# Patient Record
Sex: MALE | Race: BLACK OR AFRICAN AMERICAN | Employment: PART TIME | URBAN - METROPOLITAN AREA
[De-identification: names, ages, dates, MRNs, and addresses within clinical notes are randomized per-mention and may not be internally consistent; named-entity substitution may affect disease eponyms.]

---

## 2018-06-11 PROCEDURE — 99283 EMERGENCY DEPT VISIT LOW MDM: CPT

## 2018-06-12 ENCOUNTER — HOSPITAL ENCOUNTER (EMERGENCY)
Facility: CLINIC | Age: 43
Discharge: HOME | End: 2018-06-12
Attending: EMERGENCY MEDICINE | Admitting: EMERGENCY MEDICINE
Payer: COMMERCIAL

## 2018-06-12 ENCOUNTER — APPOINTMENT (OUTPATIENT)
Dept: CT IMAGING | Facility: CLINIC | Age: 43
End: 2018-06-12
Attending: EMERGENCY MEDICINE
Payer: COMMERCIAL

## 2018-06-12 VITALS
SYSTOLIC BLOOD PRESSURE: 135 MMHG | OXYGEN SATURATION: 98 % | HEIGHT: 71 IN | BODY MASS INDEX: 25.2 KG/M2 | TEMPERATURE: 98.2 F | RESPIRATION RATE: 18 BRPM | DIASTOLIC BLOOD PRESSURE: 82 MMHG | WEIGHT: 180 LBS

## 2018-06-12 DIAGNOSIS — R10.9 LEFT FLANK PAIN: ICD-10-CM

## 2018-06-12 LAB
ALBUMIN UR-MCNC: NEGATIVE MG/DL
APPEARANCE UR: CLEAR
BILIRUB UR QL STRIP: NEGATIVE
COLOR UR AUTO: YELLOW
GLUCOSE UR STRIP-MCNC: NEGATIVE MG/DL
HGB UR QL STRIP: NEGATIVE
KETONES UR STRIP-MCNC: NEGATIVE MG/DL
LEUKOCYTE ESTERASE UR QL STRIP: NEGATIVE
MUCOUS THREADS #/AREA URNS LPF: PRESENT /LPF
NITRATE UR QL: NEGATIVE
PH UR STRIP: 6 PH (ref 5–7)
RBC #/AREA URNS AUTO: 2 /HPF (ref 0–2)
SOURCE: ABNORMAL
SP GR UR STRIP: 1.03 (ref 1–1.03)
UROBILINOGEN UR STRIP-MCNC: 0 MG/DL (ref 0–2)
WBC #/AREA URNS AUTO: 1 /HPF (ref 0–5)

## 2018-06-12 PROCEDURE — 25000132 ZZH RX MED GY IP 250 OP 250 PS 637: Performed by: EMERGENCY MEDICINE

## 2018-06-12 PROCEDURE — 72131 CT LUMBAR SPINE W/O DYE: CPT

## 2018-06-12 PROCEDURE — 81001 URINALYSIS AUTO W/SCOPE: CPT | Performed by: EMERGENCY MEDICINE

## 2018-06-12 PROCEDURE — 74176 CT ABD & PELVIS W/O CONTRAST: CPT

## 2018-06-12 RX ORDER — DIAZEPAM 5 MG
5 TABLET ORAL EVERY 6 HOURS PRN
Qty: 20 TABLET | Refills: 0 | Status: SHIPPED | OUTPATIENT
Start: 2018-06-12 | End: 2018-06-19

## 2018-06-12 RX ORDER — DIAZEPAM 5 MG
5 TABLET ORAL ONCE
Status: COMPLETED | OUTPATIENT
Start: 2018-06-12 | End: 2018-06-12

## 2018-06-12 RX ORDER — OXYCODONE HYDROCHLORIDE 5 MG/1
5 TABLET ORAL ONCE
Status: COMPLETED | OUTPATIENT
Start: 2018-06-12 | End: 2018-06-12

## 2018-06-12 RX ADMIN — OXYCODONE HYDROCHLORIDE 5 MG: 5 TABLET ORAL at 04:55

## 2018-06-12 RX ADMIN — DIAZEPAM 5 MG: 5 TABLET ORAL at 04:55

## 2018-06-12 ASSESSMENT — ENCOUNTER SYMPTOMS
ABDOMINAL PAIN: 0
DIFFICULTY URINATING: 0
FEVER: 0
ARTHRALGIAS: 1
MYALGIAS: 1

## 2018-06-12 NOTE — ED PROVIDER NOTES
"  History   Chief Complaint:  Flank and Back Pain    HPI   Rohan Whitt is a 43 year old male who presents with back pain. The patient reports he has been having back and left flank pain for the past 2 weeks that travels to his knee and through his ribs. He says his pain has been getting progressively worse of the past 2 weeks, prompting him to visit the [facility] to address his pain. He was prescribed Oxycodone, but still experiences his pain, prompting him to visit the emergency department. The patient reports his pain was not present earlier today, but he felt a sudden onset of severe pain while at work today. He states that his left big toe occasionally \"locks up\", but does not feel this in the emergency department tonight. He says this has been happening to his left toe for about 4 days and has taken Advil to address his symptoms, but found little resolve. He states he took 10 mg Oxycodone at 2215 today. The patient says he has been taking Acutane for 3 months for his severe acne, but stopped taking it 4 days ago because he was worried the medication was causing his current pain. The patient denies fever, trouble urinating, and trouble pooping.    Of note, the patient states [place] side of his body was hurt. He states he was knocked unconscious, but had no fractures. He says that this is where he formed his relationship with the pain clinic he visited for his current symptoms.    Allergies:  NKDA     Medications:    Oxycodone  Ranitidine    Past Medical History:    GERD  Brachial neuritis or radiculitis  Cervicalgia    Past Surgical History:    Eye surgery    Family History:    No past pertinent family history.    Social History:  Marital Status:   Presents to the emergency department with his wife.  Negative for alcohol use.  Negative for tobacco use.     Review of Systems   Constitutional: Negative for fever. " "  Gastrointestinal: Negative for abdominal pain.   Genitourinary: Negative for difficulty urinating.   Musculoskeletal: Positive for arthralgias and myalgias.   Skin: Negative for rash.   All other systems reviewed and are negative.    Physical Exam     Patient Vitals for the past 24 hrs:   BP Temp Temp src Heart Rate Resp SpO2 Height Weight   06/12/18 0445 135/82 - - - - 98 % - -   06/12/18 0430 131/88 - - - - 98 % - -   06/12/18 0415 (!) 129/91 - - - - 95 % - -   06/12/18 0400 131/89 - - - - 97 % - -   06/12/18 0345 (!) 127/95 - - - - 98 % - -   06/12/18 0330 128/79 - - - - 98 % - -   06/12/18 0300 137/90 - - - - - - -   06/12/18 0245 119/75 - - - - - - -   06/12/18 0230 122/78 - - - - 96 % - -   06/12/18 0215 135/83 - - - - 96 % - -   06/12/18 0200 133/81 - - - - 97 % - -   06/12/18 0145 131/90 - - - - - - -   06/12/18 0115 123/78 - - - - - - -   06/12/18 0100 128/80 - - - - - - -   06/12/18 0045 (!) 153/96 - - - - 99 % - -   06/12/18 0030 121/75 - - - - 96 % - -   06/11/18 2334 130/60 98.2  F (36.8  C) Temporal 87 18 100 % 1.803 m (5' 11\") 81.6 kg (180 lb)     Physical Exam  Constitutional:  Appears well-developed and well-nourished. Alert. Conversant. Non toxic.  HENT:   Head: Atraumatic.   Nose: Nose normal.  Mouth/Throat: Oral mucosa is clear and moist. no trismus. Pharynx normal. Tonsils symmetric. No tonsillar enlargement, erythema, or exudate.  Eyes: Conjunctivae normal. EOM normal. Pupils equal, round, and reactive to light. No scleral icterus.   Neck: Normal range of motion. Neck supple. No tracheal deviation present.   Cardiovascular: Normal rate, regular rhythm. No gallop. No friction rub. No murmur heard. Symmetric radial artery pulses   Pulmonary/Chest: Effort normal. No stridor. No respiratory distress. No wheezes. No rales. No rhonchi . No tenderness.   Abdominal: Soft. Bowel sounds normal. No distension. No mass. No tenderness. No rebound. No guarding.   Musculoskeletal:   RUE: Normal range of " motion. No tenderness. No deformity  LUE: Normal range of motion. No tenderness. No deformity  RLE: Normal range of motion. No edema. No tenderness. No deformity  LLE: Normal range of motion. No edema. No tenderness. No deformity  Lymph: No cervical adenopathy.   Neurological: Alert and oriented to person, place, and time. Normal strength. CN II-VII intact. No sensory deficit. GCS eye subscore is 4. GCS verbal subscore is 5. GCS motor subscore is 6. Normal coordination   Sensory: Normal light touch sensation bilaterally on the anteromedial thigh (L3), medial malleolus (L4), dorsal first web space (L5), lateral malleolus (S1).  Strength: 5/5 strength bilaterally in the hip flexors (L3), quadriceps (L4), tibialis anterior, EHL (L5), gastrocnemius (S1), and hamstring.  DTRs: symmetric in the patella (2/4) and in the achilles tendons.  Negative straight leg raise bilaterally.  He does have back pain with lifting his left leg , but no radicular pain.  Skin: Skin is warm and dry. No rash noted. No pallor. Normal capillary refill.  Psychiatric:  Normal mood. Normal affect.     Emergency Department Course   Imaging:  Radiographic findings were communicated with the patient who voiced understanding of the findings.    CT Abdomen/Pelvis w/o IV contrast-stone protocol:   1. No cause of acute pain identified in the abdomen or pelvis.  2. No renal or ureteral calculi or evidence of urinary obstruction.  3. A moderate amount of stool is present within the colon as per radiology.    CT Lumbar Spine without contrast:   Preliminary report [facility] radiology  Impression:  1.  No visualized acute fracture or other acute osseous abnormality of the lumbar spine.  2.  Mild to moderate degenerative changes scattered within the lumbar spine.  3.  Mild to moderate bilateral neural foraminal narrowing at the L4-L5 level due to degenerative changes  4.  Fusion of the left sacroiliac joint    Laboratory:  UA with micro: Mucous: Present,  o/w negative    Interventions:  0455: Valium 5 mg PO  0455: Oxycodone 5 mg PO    Emergency Department Course:  Past medical records, nursing notes, and vitals reviewed.  0021: I performed an exam of the patient and obtained history, as documented above.  IV inserted and blood drawn.  The patient was sent for an abdomen/pelvis CT while in the emergency department, findings above.  The patient was sent for a lumbar spine CT while in the emergency department, findings above.    0419: I rechecked the patient. Explained findings to the patient and his wife.    I rechecked the patient. Findings and plan explained to the patient and his wife. Patient discharged home with instructions regarding supportive care, medications, and reasons to return. The importance of close follow-up was reviewed.     Impression & Plan    Medical Decision Making:  Rohan Whitt is a 43 year old male who presented with left flank, low back, lower quadrant abdominal pain.  He had been having some radiation of pain down his left leg, but is not having any of that this evening and has no focal neurologic deficits at this time pain has improved with interventions in the emergency department.  Differential here included abdominal or trunk cellulitis but exam is negative for that.  No evidence for shingles.  No history of fall or trauma.  No evidence for any hematoma.  Pain is not high enough to be indicative of pulmonary or rib pathology.  We did consider possible kidney stone, but urinalysis is normal and CT scan stone protocol is negative.  No evidence for kidney stone, hydronephrosis, abdominal aortic aneurysm, diverticulitis, colitis, other cause for pain.  There is note of some constipation.  The patient has not had a fever, saddle/perineal anesthesia, bilateral foot numbness, or bowel or bladder dysfunction.  We did perform CT of the lumbar spine which shows degenerative changes but no obvious bulging disc, cauda equina, or other acute  neurosurgical emergency.  There is no clinical evidence of cauda equina syndrome, discitis, spinal/epidural space hematoma or epidural abscess. The neurological exam is normal and the patient's symptoms seem consistent with a musculoskeletal issues and significant muscle spasm. The patient will be discharged with pain medications to use as directed. Ice or heat to the back and stretching exercises. No heavy lifting, bending or twisting. Return if increasing pain, numbness, weakness, or bowel or bladder dysfunction. The patient was advised to schedule follow-up with their primary doctor within 3 days to re-assess symptoms.    Diagnosis:    ICD-10-CM   1. Left flank pain R10.9     Disposition:  discharged to home.    Discharge Medications:  Discharge Medication List as of 6/12/2018  4:58 AM   START taking these medications    Details   diazepam (VALIUM) 5 MG tablet Take 1 tablet (5 mg) by mouth every 6 hours as needed for anxiety or sleep (MUSCLE SPASM), Disp-20 tablet, R-0, Local Print     Scribe Disclosure:  IYosi, am serving as a scribe on 6/12/2018 at 12:21 AM to personally document services performed by Wander Quezada MD based on my observations and the provider's statements to me.     Yosi Dumont  6/11/2018   [facility] EMERGENCY DEPARTMENT       Wander Quezada MD  06/12/18 2894

## 2018-06-12 NOTE — LETTER
June 12, 2018      To Whom It May Concern:      Rohan Whitt was seen in our Emergency Department today, 06/12/18.  I expect his condition to improve over the next 2-3 days.  He may return to work/school when improved.    Sincerely,        Wander Quezada MD

## 2018-06-12 NOTE — ED AVS SNAPSHOT
[facility] Emergency Department    [address]    [facility] [address]    Phone:  [phone]    Fax:  [phone]                                       Rohan Whitt   MRN: [id number]    Department:  [facility] Emergency Department   Date of Visit:  6/11/2018           After Visit Summary Signature Page     I have received my discharge instructions, and my questions have been answered. I have discussed any challenges I see with this plan with the nurse or doctor.    ..........................................................................................................................................  Patient/Patient Representative Signature      ..........................................................................................................................................  Patient Representative Print Name and Relationship to Patient    ..................................................               ................................................  Date                                            Time    ..........................................................................................................................................  Reviewed by Signature/Title    ...................................................              ..............................................  Date                                                            Time

## 2018-06-12 NOTE — ED AVS SNAPSHOT
[facility]    [address] [place] [address]    Phone:  [phone]    Fax:  [phone]                                       Rohan Whitt   MRN: [id number]    Department:  [facility]   Date of Visit:  6/11/2018           Patient Information     Date Of Birth          1975        Your diagnoses for this visit were:     Left flank pain        You were seen by Wander Quezada MD.      Follow-up Information     Follow up with [facility] [place] In 3 days.    Contact information:    [address] [facility] [place] [address] [phone]          Follow up with Mikey Jaramillo MD In 3 days.    Specialty:  Neurosurgery    Why:  As needed    Contact information:    [facility]  [address]  [place] [address]  [phone]          Discharge Instructions         Discharge Instructions  Back Pain  You were seen today for back pain. Back pain can have many causes, but most will get better without surgery or other specific treatment. Sometimes there is a herniated ( slipped ) disc. We don t usually do MRI scans to look for these right away, since most herniated discs will get better on their own with time.  Today, we did not find any evidence that your back pain was caused by a serious condition, such as an infection, fracture, or tumor. However, sometimes symptoms develop over time and cannot be found during an emergency visit, so it is very important that you follow up with your primary doctor.  Return to the Emergency Department if:    You develop a fever with your back pain.     You have weakness or change in sensation in one or both legs.    You lose control of your bowels or bladder, or can t empty your bladder.    Your pain gets much worse.     Follow-up with your doctor:    Unless your pain has completely gone away, please make an appointment with your doctor within  one week.  You may need further management of your back pain, such as more pain medication, imaging such as an X-ray or MRI, or physical therapy.    What can I do to help myself?    Remain Active -- People are often afraid that they will hurt their back further or delay recovery by remaining active, but this is one of the best things you can do for your back. In fact, prolonged bed rest is not recommended. Studies have shown that people with low back pain recover faster when they remain active. Movement helps to bring blood flow to the muscles and relieve muscle spasms as well as preventing loss of muscle strength.    Heat -- Using a heating pad can help with low back pain during the first few weeks. Do not sleep with a heating pad, as you can be burned.     Pain medications - You may take a pain medication such as Tylenol  (acetaminophen), Advil , Nuprin  (ibuprofen) or Aleve  (naproxen).  If you have been given a narcotic such as Vicodin  (hydrocodone with acetaminophen), Percocet  (oxycodone with acetaminophen), codeine, or a muscle relaxant such as Flexeril  (cyclobenzaprine) or Soma  (carisoprodol), do not drive for four hours after you have taken it. If the narcotic contains Tylenol  (acetaminophen), do not take Tylenol  with it. All narcotics will cause constipation, so eat a high fiber diet.   If you were given a prescription for medicine here today, be sure to read all of the information (including the package insert) that comes with your prescription.  This will include important information about the medicine, its side effects, and any warnings that you need to know about.  The pharmacist who fills the prescription can provide more information and answer questions you may have about the medicine.  If you have questions or concerns that the pharmacist cannot address, please call or return to the Emergency Department.   Opioid Medication Information    Pain medications are among the most commonly prescribed  medicines, so we are including this information for all our patients. If you did not receive pain medication or get a prescription for pain medicine, you can ignore it.     You may have been given a prescription for an opioid (narcotic) pain medicine and/or have received a pain medicine while here in the Emergency Department. These medicines can make you drowsy or impaired. You must not drive, operate dangerous equipment, or engage in any other dangerous activities while taking these medications. If you drive while taking these medications, you could be arrested for DUI, or driving under the influence. Do not drink any alcohol while you are taking these medications.     Opioid pain medications can cause addiction. If you have a history of chemical dependency of any type, you are at a higher risk of becoming addicted to pain medications.  Only take these prescribed medications to treat your pain when all other options have been tried. Take it for as short a time and as few doses as possible. Store your pain pills in a secure place, as they are frequently stolen and provide a dangerous opportunity for children or visitors in your house to start abusing these powerful medications. We will not replace any lost or stolen medicine.  As soon as your pain is better, you should flush all your remaining medication.     Many prescription pain medications contain Tylenol  (acetaminophen), including Vicodin , Tylenol #3 , Norco , Lortab , and Percocet .  You should not take any extra pills of Tylenol  if you are using these prescription medications or you can get very sick.  Do not ever take more than 3000 mg of acetaminophen in any 24 hour period.    All opioids tend to cause constipation. Drink plenty of water and eat foods that have a lot of fiber, such as fruits, vegetables, prune juice, apple juice and high fiber cereal.  Take a laxative if you don t move your bowels at least every other day. Miralax , Milk of Magnesia,  Colace , or Senna  can be used to keep you regular.      Remember that you can always come back to the Emergency Department if you are not able to see your regular doctor in the amount of time listed above, if you get any new symptoms, or if there is anything that worries you.        24 Hour Appointment Hotline       To make an appointment at any [facility], call [phone] ([phone]). If you don't have a family doctor or clinic, we will help you find one. [facility] are conveniently located to serve the needs of you and your family.             Review of your medicines      START taking        Dose / Directions Last dose taken    diazepam 5 MG tablet   Commonly known as:  VALIUM   Dose:  5 mg   Quantity:  20 tablet        Take 1 tablet (5 mg) by mouth every 6 hours as needed for anxiety or sleep (MUSCLE SPASM)   Refills:  0          Our records show that you are taking the medicines listed below. If these are incorrect, please call your family doctor or clinic.        Dose / Directions Last dose taken    OXYCODONE HCL PO   Dose:  5 mg        Take 5 mg by mouth   Refills:  0        RANITIDINE HCL PO        Refills:  0                Information about OPIOIDS     PRESCRIPTION OPIOIDS: WHAT YOU NEED TO KNOW   You have a prescription for an opioid (narcotic) pain medicine. Opioids can cause addiction. If you have a history of chemical dependency of any type, you are at a higher risk of becoming addicted to opioids. Only take this medicine after all other options have been tried. Take it for as short a time and as few doses as possible.     Do not:    Drive. If you drive while taking these medicines, you could be arrested for driving under the influence (DUI).    Operate heavy machinery    Do any other dangerous activities while taking these medicines.     Drink any alcohol while taking these medicines.      Take with any other medicines that contain acetaminophen. Read all labels carefully. Look  for the word  acetaminophen  or  Tylenol.  Ask your pharmacist if you have questions or are unsure.    Store your pills in a secure place, locked if possible. We will not replace any lost or stolen medicine. If you don t finish your medicine, please throw away (dispose) as directed by your pharmacist. The [place] [employer] has more information about safe disposal: https://www.pca.state.[place]/living-green/managing-unwanted-medications    All opioids tend to cause constipation. Drink plenty of water and eat foods that have a lot of fiber, such as fruits, vegetables, prune juice, apple juice and high-fiber cereal. Take a laxative (Miralax, milk of magnesia, Colace, Senna) if you don t move your bowels at least every other day.         Prescriptions were sent or printed at these locations (1 Prescription)                   Other Prescriptions                Printed at Department/Unit printer (1 of 1)         diazepam (VALIUM) 5 MG tablet                Procedures and tests performed during your visit     CT Abdomen Pelvis w/o Contrast    Lumbar spine CT w/o contrast    UA with Microscopic      Orders Needing Specimen Collection     None      Pending Results     Date and Time Order Name Status Description    6/12/2018 0039 Lumbar spine CT w/o contrast In process             Pending Culture Results     No orders found from 6/10/2018 to 6/13/2018.            Pending Results Instructions     If you had any lab results that were not finalized at the time of your Discharge, you can call the ED Lab Result RN at [phone]. You will be contacted by this team for any positive Lab results or changes in treatment. The nurses are available 7 days a week from 10A to 6:30P.  You can leave a message 24 hours per day and they will return your call.        Test Results From Your Hospital Stay        6/12/2018  1:04 AM      Component Results     Component Value Ref Range & Units Status    Color Urine Yellow  Final     Appearance Urine Clear  Final    Glucose Urine Negative NEG^Negative mg/dL Final    Bilirubin Urine Negative NEG^Negative Final    Ketones Urine Negative NEG^Negative mg/dL Final    Specific Gravity Urine 1.030 1.003 - 1.035 Final    Blood Urine Negative NEG^Negative Final    pH Urine 6.0 5.0 - 7.0 pH Final    Protein Albumin Urine Negative NEG^Negative mg/dL Final    Urobilinogen mg/dL 0.0 0.0 - 2.0 mg/dL Final    Nitrite Urine Negative NEG^Negative Final    Leukocyte Esterase Urine Negative NEG^Negative Final    Source Midstream Urine  Final    WBC Urine 1 0 - 5 /HPF Final    RBC Urine 2 0 - 2 /HPF Final    Mucous Urine Present (A) NEG^Negative /LPF Final         6/12/2018  4:41 AM      Narrative     CT ABDOMEN AND PELVIS WITHOUT CONTRAST  6/12/2018 1:55 AM     HISTORY: Left flank pain.    COMPARISON: None.    TECHNIQUE: Note that this study was performed in conjunction with a CT  scan of the lumbar spine. Refer to a separate report for findings from  that study. Without intravenous or oral contrast, helical sections  were acquired from the top of the diaphragm through the pubic  symphysis. Coronal reconstructions were generated. Radiation dose for  this scan was reduced using automated exposure control, adjustment of  the mA and/or kV according to the patient's size, or iterative  reconstruction technique. (Renal stone protocol).    FINDINGS:  Right urinary tract: No renal or ureteral calculi. No dilatation of  the intrarenal collecting system or ureter.    Left urinary tract: No renal or ureteral calculi. No dilatation of the  intrarenal collecting system or ureter.    Urinary bladder: No visualized calculi.    Remainder of the abdomen and pelvis: The liver, spleen, pancreas and  adrenal glands are unremarkable to the limits of a noncontrast CT  scan. The gallbladder is present. The small and large bowel are normal  in caliber. A moderate amount of stool is present within the colon.  The appendix is within  normal limits. No bowel wall thickening,  pneumatosis or free intraperitoneal gas. No enlarged lymph nodes or  free fluid in the abdomen or pelvis.    Scan through the lower chest is unremarkable.        Impression     IMPRESSION:  1. No cause of acute pain identified in the abdomen or pelvis.  2. No renal or ureteral calculi or evidence of urinary obstruction.  3. A moderate amount of stool is present within the colon.    DALLAS ZENDEJAS MD         6/12/2018  1:33 AM      Result not yet available     Exam Ended                Clinical Quality Measure: Blood Pressure Screening     Your blood pressure was checked while you were in the emergency department today. The last reading we obtained was  BP: 135/82 . Please read the guidelines below about what these numbers mean and what you should do about them.  If your systolic blood pressure (the top number) is less than 120 and your diastolic blood pressure (the bottom number) is less than 80, then your blood pressure is normal. There is nothing more that you need to do about it.  If your systolic blood pressure (the top number) is 120-139 or your diastolic blood pressure (the bottom number) is 80-89, your blood pressure may be higher than it should be. You should have your blood pressure rechecked within a year by a primary care provider.  If your systolic blood pressure (the top number) is 140 or greater or your diastolic blood pressure (the bottom number) is 90 or greater, you may have high blood pressure. High blood pressure is treatable, but if left untreated over time it can put you at risk for heart attack, stroke, or kidney failure. You should have your blood pressure rechecked by a primary care provider within the next 4 weeks.  If your provider in the emergency department today gave you specific instructions to follow-up with your doctor or provider even sooner than that, you should follow that instruction and not wait for up to 4 weeks for your follow-up  "visit.        Thank you for choosing [facility]       Thank you for choosing [facility] for your care. Our goal is always to provide you with excellent care. Hearing back from our patients is one way we can continue to improve our services. Please take a few minutes to complete the written survey that you may receive in the mail after you visit with us. Thank you!        [employer] Information     [email or web address] lets you send messages to your doctor, view your test results, renew your prescriptions, schedule appointments and more. To sign up, go to [email or web address] . Click on \"Log in\" on the left side of the screen, which will take you to the Welcome page. Then click on \"Sign up Now\" on the right side of the page.     You will be asked to enter the access code listed below, as well as some personal information. Please follow the directions to create your username and password.     Your access code is: [email or web address]  Expires: 9/10/2018  4:58 AM     Your access code will  in 90 days. If you need help or a new code, please call your [facility] clinic or [phone].        Care EveryWhere ID     This is your Care EveryWhere ID. This could be used by other organizations to access your [facility] medical records  [id number]        Equal Access to Services     NATA TAO : Susanne overtono Socordelia, waaxda luqadaha, qaybta kaalmada adeegyada, mary calderón. So [facility] [phone].    ATENCIÓN: Si habla español, tiene a suh disposición servicios gratuitos de asistencia lingüística. Llame al [phone].    We comply with applicable federal civil rights laws and [place] laws. We do not discriminate on the basis of race, color, national origin, age, disability, sex, sexual orientation, or gender identity.            After Visit Summary       This is your record. Keep this with you and show to your community pharmacist(s) and doctor(s) at your next visit.                  "

## 2018-06-12 NOTE — ED NOTES
Pt prescribed oxycodone 510mg. Took 2tabs/10mg at 2215 without relieve. Pt reports pain has been ongojng for 2 weeks. Denies any falls or triggers. Pain is to lower back radiating to L abd. Reports afew days ago it was radiating to the left foot making harder for him to walk

## 2018-06-12 NOTE — ED TRIAGE NOTES
"A&Ox4. ABC's intact. Pt c/o left flank pain x2 weeks,  With pain down the left leg.  States left great toe \"was locked\".  Denies urinary symptoms. PCP prescribed oxycodone for pain relief. Took Advil 400mg at 2000, took oxycodone 10mg at 2215  "

## 2018-06-20 ENCOUNTER — OFFICE VISIT (OUTPATIENT)
Dept: NEUROSURGERY | Facility: CLINIC | Age: 43
End: 2018-06-20
Attending: NURSE PRACTITIONER
Payer: COMMERCIAL

## 2018-06-20 VITALS
HEIGHT: 70 IN | WEIGHT: 179 LBS | BODY MASS INDEX: 25.62 KG/M2 | DIASTOLIC BLOOD PRESSURE: 75 MMHG | SYSTOLIC BLOOD PRESSURE: 116 MMHG | OXYGEN SATURATION: 99 % | HEART RATE: 73 BPM

## 2018-06-20 DIAGNOSIS — M54.16 LUMBAR RADICULAR PAIN: ICD-10-CM

## 2018-06-20 DIAGNOSIS — M54.6 THORACIC SPINE PAIN: Primary | ICD-10-CM

## 2018-06-20 PROCEDURE — 99243 OFF/OP CNSLTJ NEW/EST LOW 30: CPT | Performed by: NURSE PRACTITIONER

## 2018-06-20 ASSESSMENT — PAIN SCALES - GENERAL: PAINLEVEL: SEVERE PAIN (6)

## 2018-06-20 NOTE — PATIENT INSTRUCTIONS
1. Please call [facility] to have thoracic and lumbar MRI completed. Call [phone] to schedule your scan.  I will contact you with results.

## 2018-06-20 NOTE — PROGRESS NOTES
Dr. Mikey Jaramillo  [facility]  Neurosurgery Clinic Visit      CC: mid and low back pain    Primary care Provider: [facility]      Reason For Visit:   I was asked by  Pain Clinic to consult on the patient for mid and low back pain.      HPI: Rohan Whitt is a 43 year old male with mid and low back pain. He notes that he had some injury in the past but the pain was managed at  Pain clinic.  He states that in the past month he started to have more severe pain. He states that he started to have severe left great toe pain and foot pain.  He states that he has had back pain for many years.  Then he started to have severe left flank pain and was taken to ER. He was treated and released.  He reports that the worse pain is a tightness to his left flank and thoracic spine.  He states that the pain comes and goes but can be very severe. He does not know any triggers for this pain.  He states that Oxycodone helps his pain. He has not had PT or injections for his pain.  He currently works in sanitation.      Pain at its worst 10  Pain right now:  6    Past Medical History:   Diagnosis Date     Allergic state      Gastroesophageal reflux disease        Past Medical History reviewed with patient during visit.    Past Surgical History:   Procedure Laterality Date     EYE SURGERY       Past Surgical History reviewed with patient during visit.    Current Outpatient Prescriptions   Medication     OXYCODONE HCL PO     RANITIDINE HCL PO     No current facility-administered medications for this visit.        No Known Allergies    Social History     Social History     Marital status:      Spouse name: N/A     Number of children: N/A     Years of education: N/A     Social History Main Topics     Smoking status: Never Smoker     Smokeless tobacco: Never Used     Alcohol use No     Drug use: No     Sexual activity: Not Asked     Other Topics Concern     None     Social History  "Narrative       History reviewed. No pertinent family history.      Review Of Systems  Skin: negative  Eyes: negative  Ears/Nose/Throat: negative  Respiratory: No shortness of breath, dyspnea on exertion, cough, or hemoptysis  Cardiovascular: negative  Gastrointestinal: negative  Genitourinary: negative  Musculoskeletal: back pain  Neurologic: negative  Psychiatric: negative  Hematologic/Lymphatic/Immunologic: negative  Endocrine: negative     ROS: 10 point ROS neg other than the symptoms noted above in the HPI.        Vital Signs: /75 (BP Location: Right arm, Patient Position: Chair, Cuff Size: Adult Large)  Pulse 73  Ht 5' 10\" (1.778 m)  Wt 179 lb (81.2 kg)  SpO2 99%  BMI 25.68 kg/m2    Examination:  Constitutional:  Alert, well nourished, NAD.  Memory: recent and remote memory intact   HEENT: Normocephalic, atraumatic.   Pulm:  Without shortness of breath   CV:  No pitting edema of BLE.    Neurological:  Awake  Alert  Oriented x 3  Speech clear  Cranial nerves II - XII intact  PERRL  EOMI  Face symmetric  Tongue midline  Motor exam   Shoulder Abduction:  Right:  5/5   Left:  5/5  Biceps:                      Right:  5/5   Left:  5/5  Triceps:                     Right:  5/5   Left:  5/5  Wrist Extensors:       Right:  5/5   Left:  5/5  Wrist Flexors:           Right:  5/5   Left:  5/5  Intrinsics:                   Right:  5/5   Left:  5/5   Hip Flexor:                Right: 5/5  Left:  5/5  Hip Adductor:             Right:  5/5  Left:  5/5  Hip Abductor:             Right:  5/5  Left:  5/5  Gastroc Soleus:        Right:  5/5  Left:  5/5  Tib/Ant:                      Right:  5/5  Left:  5/5  EHL:                          Right:  5/5  Left:  5/5   Sensation normal to bilateral upper and lower extremities  Muscle tone to bilateral upper and lower extremities normal   Gait: Able to stand from a seated position. Normal non-antalgic, non-myelopathic gait.  Able to heel/toe walk without loss of " balance  Noted left flank pain and thoracic spine pain.     Lumbar examination reveals tenderness of the spine and paraspinous muscles.  Pain with lumbar extension.   Hip height is symmetrical. Negative SI joint, sciatic notch or greater trochanteric tenderness to palpation bilaterally.        Imaging:   Lumbar CT:    IMPRESSION:    1. At L2-L3 there is mild to moderate left foraminal stenosis.  2. At L4-L5 there is mild to moderate central stenosis and moderate  bilateral foraminal stenosis.  3. At L5-S1 there is mild central stenosis.  4. There are bridging osteophytes/fusion involving the left SI joint.       CT Abd:    IMPRESSION:  1. No cause of acute pain identified in the abdomen or pelvis.  2. No renal or ureteral calculi or evidence of urinary obstruction.  3. A moderate amount of stool is present within the colon.       Assessment/Plan:   Rohan Whitt is a 43 year old male with mid and low back pain. He notes that he had some injury in the past but the pain was managed at  Pain clinic.  He states that in the past month he started to have more severe pain. He states that he started to have severe left great toe pain and foot pain.  He states that he has had back pain for many years.  Then he started to have severe left flank pain and was taken to ER. He was treated and released.  He reports that the worse pain is a tightness to his left flank and thoracic spine.  He states that the pain comes and goes but can be very severe. He does not know any triggers for this pain.  He states that Oxycodone helps his pain. He has not had PT or injections for his pain.  He currently works in sanitation. He states that his left leg and foot pain have resolved but he does have chronic LBP.  His CT scan does show levels of stenosis. At this time, we will have him obtain a thoracic and lumbar MRI. He is open to this.     Patient Instructions   1. Please call [facility] to have thoracic and lumbar MRI  completed. Call [phone] to schedule your scan.  I will contact you with results.        Shea Camilo [facility]  [facility]  [address] [address]    Tel [phone]  Pager [phone]

## 2018-06-20 NOTE — MR AVS SNAPSHOT
After Visit Summary   6/20/2018    Rohan Whitt    MRN: [id number]           Patient Information     Date Of Birth          1975        Visit Information        Provider Department      6/20/2018 11:50 AM Shea Camilo APRN CNP [facility]        Today's Diagnoses     Thoracic spine pain    -  1    Lumbar radicular pain          Care Instructions    1. Please call [facility] to have thoracic and lumbar MRI completed. Call [phone] to schedule your scan.  I will contact you with results.           Follow-ups after your visit        Your next 10 appointments already scheduled     Jun 20, 2018 11:50 AM CDT   New Visit with SHIRA Marks CNP   [facility] ([facility])    [address] [address] [address] [phone]              Future tests that were ordered for you today     Open Future Orders        Priority Expected Expires Ordered    MR Lumbar Spine w/o Contrast Routine  6/20/2019 6/20/2018    MR Thoracic Spine w/o Contrast Routine  6/20/2019 6/20/2018            Who to contact     If you have questions or need follow up information about today's clinic visit or your schedule please contact [facility] directly at [phone].  Normal or non-critical lab and imaging results will be communicated to you by MyChart, letter or phone within 4 business days after the clinic has received the results. If you do not hear from us within 7 days, please contact the clinic through MyChart or phone. If you have a critical or abnormal lab result, we will notify you by phone as soon as possible.  Submit refill requests through [employer] or call your pharmacy and they will forward the refill request to us. Please allow 3 business days for your refill to be completed.          Additional Information About Your Visit        [employer] Information     [employer] lets you  "send messages to your doctor, view your test results, renew your prescriptions, schedule appointments and more. To sign up, go to [email or web address] . Click on \"Log in\" on the left side of the screen, which will take you to the Welcome page. Then click on \"Sign up Now\" on the right side of the page.     You will be asked to enter the access code listed below, as well as some personal information. Please follow the directions to create your username and password.     Your access code is: [email or web address]  Expires: 9/10/2018  4:58 AM     Your access code will  in 90 days. If you need help or a new code, please call your [facility] clinic or [phone].        Care EveryWhere ID     This is your Care EveryWhere ID. This could be used by other organizations to access your [facility] medical records  [id number]        Your Vitals Were     Pulse Height Pulse Oximetry BMI (Body Mass Index)          73 5' 10\" (1.778 m) 99% 25.68 kg/m2         Blood Pressure from Last 3 Encounters:   18 116/75   18 135/82    Weight from Last 3 Encounters:   18 179 lb (81.2 kg)   18 180 lb (81.6 kg)               Primary Care Provider Office Phone # Fax #    [facility] [phone] [address] [facility] [address]        Equal Access to Services     [facility]: Hadii aad ku hadasho Soomaali, waaxda luqadaha, qaybta kaalmada adeegyada, mary mares . So [facility] [phone].    ATENCIÓN: Si habla español, tiene a suh disposición servicios gratuitos de asistencia lingüística. Llame al [phone].    We comply with applicable federal civil rights laws and [place] laws. We do not discriminate on the basis of race, color, national origin, age, disability, sex, sexual orientation, or gender identity.            Thank you!     Thank you for choosing [facility]  for your care. Our goal is always to provide you with excellent " care. Hearing back from our patients is one way we can continue to improve our services. Please take a few minutes to complete the written survey that you may receive in the mail after your visit with us. Thank you!             Your Updated Medication List - Protect others around you: Learn how to safely use, store and throw away your medicines at www.disposemymeds.org.          This list is accurate as of 6/20/18 11:36 AM.  Always use your most recent med list.                   Brand Name Dispense Instructions for use Diagnosis    OXYCODONE HCL PO      Take 5 mg by mouth        RANITIDINE HCL PO

## 2018-06-20 NOTE — LETTER
6/20/2018         RE: Rohan Whitt  [address]  [place] [address]        Dear Colleague,    Thank you for referring your patient, Rohan Whitt, to the [facility]. Please see a copy of my visit note below.    Dr. Mikey Jaramillo  [facility]  Neurosurgery Clinic Visit      CC: mid and low back pain    Primary care Provider: Clinic - [facility]      Reason For Visit:   I was asked by  Pain Clinic to consult on the patient for mid and low back pain.      HPI: Rohan Whitt is a 43 year old male with mid and low back pain. He notes that he had some injury in the past but the pain was managed at  Pain clinic.  He states that in the past month he started to have more severe pain. He states that he started to have severe left great toe pain and foot pain.  He states that he has had back pain for many years.  Then he started to have severe left flank pain and was taken to ER. He was treated and released.  He reports that the worse pain is a tightness to his left flank and thoracic spine.  He states that the pain comes and goes but can be very severe. He does not know any triggers for this pain.  He states that Oxycodone helps his pain. He has not had PT or injections for his pain.  He currently works in sanitation.      Pain at its worst 10  Pain right now:  6    Past Medical History:   Diagnosis Date     Allergic state      Gastroesophageal reflux disease        Past Medical History reviewed with patient during visit.    Past Surgical History:   Procedure Laterality Date     EYE SURGERY       Past Surgical History reviewed with patient during visit.    Current Outpatient Prescriptions   Medication     OXYCODONE HCL PO     RANITIDINE HCL PO     No current facility-administered medications for this visit.        No Known Allergies    Social History     Social History     Marital status:      Spouse name: N/A     Number of children: N/A      "Years of education: N/A     Social History Main Topics     Smoking status: Never Smoker     Smokeless tobacco: Never Used     Alcohol use No     Drug use: No     Sexual activity: Not Asked     Other Topics Concern     None     Social History Narrative       History reviewed. No pertinent family history.      Review Of Systems  Skin: negative  Eyes: negative  Ears/Nose/Throat: negative  Respiratory: No shortness of breath, dyspnea on exertion, cough, or hemoptysis  Cardiovascular: negative  Gastrointestinal: negative  Genitourinary: negative  Musculoskeletal: back pain  Neurologic: negative  Psychiatric: negative  Hematologic/Lymphatic/Immunologic: negative  Endocrine: negative     ROS: 10 point ROS neg other than the symptoms noted above in the HPI.        Vital Signs: /75 (BP Location: Right arm, Patient Position: Chair, Cuff Size: Adult Large)  Pulse 73  Ht 5' 10\" (1.778 m)  Wt 179 lb (81.2 kg)  SpO2 99%  BMI 25.68 kg/m2    Examination:  Constitutional:  Alert, well nourished, NAD.  Memory: recent and remote memory intact   HEENT: Normocephalic, atraumatic.   Pulm:  Without shortness of breath   CV:  No pitting edema of BLE.    Neurological:  Awake  Alert  Oriented x 3  Speech clear  Cranial nerves II - XII intact  PERRL  EOMI  Face symmetric  Tongue midline  Motor exam   Shoulder Abduction:  Right:  5/5   Left:  5/5  Biceps:                      Right:  5/5   Left:  5/5  Triceps:                     Right:  5/5   Left:  5/5  Wrist Extensors:       Right:  5/5   Left:  5/5  Wrist Flexors:           Right:  5/5   Left:  5/5  Intrinsics:                   Right:  5/5   Left:  5/5   Hip Flexor:                Right: 5/5  Left:  5/5  Hip Adductor:             Right:  5/5  Left:  5/5  Hip Abductor:             Right:  5/5  Left:  5/5  Gastroc Soleus:        Right:  5/5  Left:  5/5  Tib/Ant:                      Right:  5/5  Left:  5/5  EHL:                          Right:  5/5  Left:  5/5   Sensation normal " to bilateral upper and lower extremities  Muscle tone to bilateral upper and lower extremities normal   Gait: Able to stand from a seated position. Normal non-antalgic, non-myelopathic gait.  Able to heel/toe walk without loss of balance  Noted left flank pain and thoracic spine pain.     Lumbar examination reveals tenderness of the spine and paraspinous muscles.  Pain with lumbar extension.   Hip height is symmetrical. Negative SI joint, sciatic notch or greater trochanteric tenderness to palpation bilaterally.        Imaging:   Lumbar CT:    IMPRESSION:    1. At L2-L3 there is mild to moderate left foraminal stenosis.  2. At L4-L5 there is mild to moderate central stenosis and moderate  bilateral foraminal stenosis.  3. At L5-S1 there is mild central stenosis.  4. There are bridging osteophytes/fusion involving the left SI joint.       CT Abd:    IMPRESSION:  1. No cause of acute pain identified in the abdomen or pelvis.  2. No renal or ureteral calculi or evidence of urinary obstruction.  3. A moderate amount of stool is present within the colon.       Assessment/Plan:   Rohan Whitt is a 43 year old male with mid and low back pain. He notes that he had some injury in the past but the pain was managed at  Pain clinic.  He states that in the past month he started to have more severe pain. He states that he started to have severe left great toe pain and foot pain.  He states that he has had back pain for many years.  Then he started to have severe left flank pain and was taken to ER. He was treated and released.  He reports that the worse pain is a tightness to his left flank and thoracic spine.  He states that the pain comes and goes but can be very severe. He does not know any triggers for this pain.  He states that Oxycodone helps his pain. He has not had PT or injections for his pain.  He currently works in [employer]. He states that his left leg and foot pain have resolved but he does have chronic LBP.   His CT scan does show levels of stenosis. At this time, we will have him obtain a thoracic and lumbar MRI. He is open to this.     Patient Instructions   1. Please call [facility] to have thoracic and lumbar MRI completed. Call [phone] to schedule your scan.  I will contact you with results.        Shea Camilo CNP  [facility]  [address] [address]    Tel [phone]  Pager [phone]      Again, thank you for allowing me to participate in the care of your patient.        Sincerely,        SHIRA Marks CNP

## 2018-06-20 NOTE — NURSING NOTE
"Rohan Whitt is a 43 year old male who presents for:  Chief Complaint   Patient presents with     Neurologic Problem     low back and left flank pain , lower quardrant abdominal pain        Vitals:    Vitals:    06/20/18 1126   BP: 116/75   BP Location: Right arm   Patient Position: Chair   Cuff Size: Adult Large   Pulse: 73   SpO2: 99%   Weight: 179 lb (81.2 kg)   Height: 5' 10\" (1.778 m)       BMI:  Estimated body mass index is 25.68 kg/(m^2) as calculated from the following:    Height as of this encounter: 5' 10\" (1.778 m).    Weight as of this encounter: 179 lb (81.2 kg).    Pain Score:  Severe Pain (6)        Miley Cruz      "

## 2018-06-21 ENCOUNTER — HOSPITAL ENCOUNTER (OUTPATIENT)
Dept: MRI IMAGING | Facility: CLINIC | Age: 43
End: 2018-06-21
Attending: NURSE PRACTITIONER
Payer: COMMERCIAL

## 2018-06-21 ENCOUNTER — HOSPITAL ENCOUNTER (OUTPATIENT)
Dept: MRI IMAGING | Facility: CLINIC | Age: 43
Discharge: HOME | End: 2018-06-21
Attending: NURSE PRACTITIONER | Admitting: NURSE PRACTITIONER
Payer: COMMERCIAL

## 2018-06-21 DIAGNOSIS — M54.16 LUMBAR RADICULAR PAIN: ICD-10-CM

## 2018-06-21 DIAGNOSIS — M54.6 THORACIC SPINE PAIN: ICD-10-CM

## 2018-06-21 PROCEDURE — 72148 MRI LUMBAR SPINE W/O DYE: CPT

## 2018-06-21 PROCEDURE — 72146 MRI CHEST SPINE W/O DYE: CPT

## 2018-06-22 ENCOUNTER — TELEPHONE (OUTPATIENT)
Dept: NEUROSURGERY | Facility: CLINIC | Age: 43
End: 2018-06-22

## 2018-06-22 NOTE — TELEPHONE ENCOUNTER
LM for call back.  Pts lumbar MRI shows degeneration. Does not correlate with left sided pain.  Thoracic MRI shows mx levels of herniation. Recc. PT and thoracic epidural.